# Patient Record
Sex: MALE | Race: WHITE | NOT HISPANIC OR LATINO | Employment: FULL TIME | URBAN - METROPOLITAN AREA
[De-identification: names, ages, dates, MRNs, and addresses within clinical notes are randomized per-mention and may not be internally consistent; named-entity substitution may affect disease eponyms.]

---

## 2019-02-25 ENCOUNTER — OFFICE VISIT (OUTPATIENT)
Dept: URGENT CARE | Facility: CLINIC | Age: 39
End: 2019-02-25
Payer: COMMERCIAL

## 2019-02-25 VITALS
RESPIRATION RATE: 16 BRPM | SYSTOLIC BLOOD PRESSURE: 150 MMHG | HEIGHT: 68 IN | DIASTOLIC BLOOD PRESSURE: 90 MMHG | WEIGHT: 200 LBS | OXYGEN SATURATION: 96 % | BODY MASS INDEX: 30.31 KG/M2 | HEART RATE: 69 BPM | TEMPERATURE: 97.9 F

## 2019-02-25 DIAGNOSIS — J03.90 TONSILLITIS WITH EXUDATE: ICD-10-CM

## 2019-02-25 DIAGNOSIS — H69.92 EUSTACHIAN TUBE DISORDER, LEFT: ICD-10-CM

## 2019-02-25 PROCEDURE — 99204 OFFICE O/P NEW MOD 45 MIN: CPT | Performed by: PHYSICIAN ASSISTANT

## 2019-02-25 RX ORDER — AMOXICILLIN 875 MG/1
875 TABLET, COATED ORAL 2 TIMES DAILY
Qty: 20 TAB | Refills: 0 | Status: SHIPPED | OUTPATIENT
Start: 2019-02-25 | End: 2023-09-22

## 2019-02-25 RX ORDER — FLUTICASONE PROPIONATE 50 MCG
1 SPRAY, SUSPENSION (ML) NASAL DAILY
Qty: 16 G | Refills: 0 | Status: SHIPPED | OUTPATIENT
Start: 2019-02-25

## 2019-02-25 ASSESSMENT — ENCOUNTER SYMPTOMS
TROUBLE SWALLOWING: 1
SHORTNESS OF BREATH: 0
HEADACHES: 1
SORE THROAT: 1
COUGH: 0
SWOLLEN GLANDS: 1
SINUS PAIN: 0
DIARRHEA: 0
VOMITING: 0
CHILLS: 0
MUSCULOSKELETAL NEGATIVE: 1
FEVER: 0
CARDIOVASCULAR NEGATIVE: 1
RESPIRATORY NEGATIVE: 1
GASTROINTESTINAL NEGATIVE: 1

## 2019-02-26 NOTE — PROGRESS NOTES
Subjective:      Josr Sarmiento is a 38 y.o. male who presents with Otalgia (Lt ear pain, it got pain and its ringing, pain radiates to his neck, it started out last week with sore throat then it got worse, it hurts to swallow)            Pharyngitis    This is a new problem. The current episode started in the past 7 days. The problem has been gradually worsening. Associated symptoms include ear pain, headaches, swollen glands and trouble swallowing. Pertinent negatives include no congestion, coughing, diarrhea, ear discharge, shortness of breath or vomiting. He has had no exposure to strep. He has tried gargles and acetaminophen for the symptoms. The treatment provided mild relief.       PMH:  has no past medical history on file.  MEDS:   Current Outpatient Prescriptions:   •  oxycodone-acetaminophen (PERCOCET) 5-325 MG TABS, Take 1-2 Tabs by mouth every four hours as needed for Mild Pain (pain). (Patient not taking: Reported on 2/25/2019), Disp: 20 Each, Rfl: 0  ALLERGIES: No Known Allergies  SURGHX:   Past Surgical History:   Procedure Laterality Date   • FLEXOR TENDON REPAIR  7/1/2011    Performed by CARISSA TAYLOR at [facility] ORS   • NERVE REPAIR  7/1/2011    Performed by CARISSA TAYLOR at [facility] OR[facility]   • TENOTOMY  7/1/2011    Performed by CAR[facility]SA TAYLOR at [facility]     SOCHX:  reports that he has quit smoking. He has never used smokeless tobacco. He reports that he drinks alcohol. He reports that he does not use drugs.  FH: family history is not on file.      Review of Systems   Constitutional: Negative for chills and fever.   HENT: Positive for ear pain, sore throat and trouble swallowing. Negative for congestion, ear discharge, hearing loss, sinus pain and tinnitus.    Respiratory: Negative.  Negative for cough and shortness of breath.    Cardiovascular: Negative.    Gastrointestinal: Negative.  Negative for diarrhea and vomiting.   Musculoskeletal: Negative.  "   Neurological: Positive for headaches.   All other systems reviewed and are negative.      Medications, Allergies, and current problem list reviewed today in Epic  Family history reviewed with patient and is not pertinent for today's visit     Objective:     /90   Pulse 69   Temp 36.6 °C (97.9 °F)   Resp 16   Ht 1.727 m (5' 8\")   Wt 90.7 kg (200 lb)   SpO2 96%   BMI 30.41 kg/m²      Physical Exam   Constitutional: He is oriented to person, place, and time. He appears well-developed and well-nourished. No distress.   HENT:   Head: Normocephalic and atraumatic.   Right Ear: Hearing, tympanic membrane, external ear and ear canal normal. Tympanic membrane is not erythematous.   Left Ear: Hearing, tympanic membrane, external ear and ear canal normal. Tympanic membrane is not erythematous.   Nose: Nose normal.   Mouth/Throat: Normal dentition. No dental caries. Oropharyngeal exudate, posterior oropharyngeal edema and posterior oropharyngeal erythema present.   Eyes: Conjunctivae are normal. Right eye exhibits no discharge. Left eye exhibits no discharge.   Neck: Normal range of motion. Neck supple.   Cardiovascular: Normal rate, regular rhythm and normal heart sounds.    No murmur heard.  Pulmonary/Chest: Effort normal and breath sounds normal. No respiratory distress. He has no wheezes. He has no rales.   Lymphadenopathy:        Head (right side): Submandibular adenopathy present.        Head (left side): Submandibular adenopathy present.     He has cervical adenopathy.        Right cervical: No superficial cervical adenopathy present.       Left cervical: No superficial cervical adenopathy present.   Neurological: He is alert and oriented to person, place, and time.   Skin: Skin is warm, dry and intact. No rash noted. He is not diaphoretic. No cyanosis or erythema. Nails show no clubbing.   Psychiatric: He has a normal mood and affect. His behavior is normal. Judgment and thought content normal.   Nursing " note and vitals reviewed.              Assessment/Plan:     1. Tonsillitis with exudate  amoxicillin (AMOXIL) 875 MG tablet   2. Eustachian tube disorder, left  fluticasone (FLONASE) 50 MCG/ACT nasal spray     Take full course of antibiotic  Increase fluids and rest  Advil or Tylenol for fever and pain  Warm beverages or Chloraseptic spray    Return to clinic or go to ED if symptoms worsen or persist. Indications for ED discussed at length. Patient voices understanding. Follow-up with your primary care provider in 3-5 days. Red flags discussed. All side effects of medication discussed including allergic response, GI upset, tendon injury, etc.    Please note that this dictation was created using voice recognition software. I have made every reasonable attempt to correct obvious errors, but I expect that there are errors of grammar and possibly content that I did not discover before finalizing the note.

## 2020-10-08 ENCOUNTER — OFFICE VISIT (OUTPATIENT)
Dept: URGENT CARE | Facility: CLINIC | Age: 40
End: 2020-10-08
Payer: COMMERCIAL

## 2020-10-08 ENCOUNTER — APPOINTMENT (OUTPATIENT)
Dept: RADIOLOGY | Facility: IMAGING CENTER | Age: 40
End: 2020-10-08
Attending: PHYSICIAN ASSISTANT
Payer: COMMERCIAL

## 2020-10-08 VITALS
SYSTOLIC BLOOD PRESSURE: 122 MMHG | WEIGHT: 193.2 LBS | RESPIRATION RATE: 14 BRPM | TEMPERATURE: 97.4 F | OXYGEN SATURATION: 94 % | BODY MASS INDEX: 29.28 KG/M2 | HEART RATE: 64 BPM | HEIGHT: 68 IN | DIASTOLIC BLOOD PRESSURE: 72 MMHG

## 2020-10-08 DIAGNOSIS — S43.401A SPRAIN OF RIGHT SHOULDER, UNSPECIFIED SHOULDER SPRAIN TYPE, INITIAL ENCOUNTER: ICD-10-CM

## 2020-10-08 DIAGNOSIS — M25.511 ACUTE PAIN OF RIGHT SHOULDER: ICD-10-CM

## 2020-10-08 PROCEDURE — 99214 OFFICE O/P EST MOD 30 MIN: CPT | Performed by: PHYSICIAN ASSISTANT

## 2020-10-08 PROCEDURE — 73030 X-RAY EXAM OF SHOULDER: CPT | Mod: TC,RT | Performed by: PHYSICIAN ASSISTANT

## 2020-10-08 ASSESSMENT — ENCOUNTER SYMPTOMS
FEVER: 0
MYALGIAS: 1
TINGLING: 0
CHILLS: 0
SHORTNESS OF BREATH: 0
SENSORY CHANGE: 0
FOCAL WEAKNESS: 0
COUGH: 0

## 2020-10-08 NOTE — PROGRESS NOTES
Subjective:   Josr Sarmiento is a 40 y.o. male who presents for Shoulder Pain (x 2 days on R shoulder.  He is unable to extend it without having pain.  He said last month he was lifting weight and he strained the shoulder. )      HPI  Patient is a 40-year-old male presents with right shoulder pain.  He states 1 month ago he was performing dips exercise and felt pain to his right shoulder.  He states he rested his shoulder and went on vacation and his pain significantly reduced.  He states once in a while he will feel a twinge to his right shoulder but no significant pain.  His pain returned to his right shoulder after performing dips exercises again about 5 to 7 days ago.  He denies any popping sounds.  However, he did feel a possible tear or injury.  His pain is located to his lateral posterior shoulder joint.  His pain is very mild at rest exacerbated with laying on his right shoulder, lifting, and other motions.  He reports associated grinding feeling with shoulder rotation.  He states ibuprofen provides some relief.  He denies any numbness, tingling, weakness, chest pain, shortness of breath, cough.  No prior history of right shoulder injury.        Review of Systems   Constitutional: Negative for chills and fever.   Respiratory: Negative for cough and shortness of breath.    Cardiovascular: Negative for chest pain.   Musculoskeletal: Positive for joint pain (right shoulder) and myalgias.   Neurological: Negative for tingling, sensory change and focal weakness.       Medications:    • amoxicillin  • fluticasone  • oxyCODONE-acetaminophen Tabs    Allergies: Patient has no known allergies.    Problem List: Josr Sarmiento does not have a problem list on file.    Surgical History:  Past Surgical History:   Procedure Laterality Date   • FLEXOR TENDON REPAIR  7/1/2011    Performed by CARISSA TAYLOR at [facility]   • NERVE REPAIR  7/1/2011    Performed by CARISSA T[facility] at [facility]  "[facility]   • TENOTOMY  7/1/2011    Performed by CARISSA TAYLOR at [facility]       Past Social Hx: Josr Sarmiento  reports that he has quit smoking. He has never used smokeless tobacco. He reports current alcohol use. He reports that he does not use drugs.     Past Family Hx:  Josr Sarmiento family history is not on file.     Problem list, medications, and allergies reviewed by myself today in Epic.     Objective:     /72   Pulse 64   Temp 36.3 °C (97.4 °F) (Temporal)   Resp 14   Ht 1.727 m (5' 8\")   Wt 87.6 kg (193 lb 3.2 oz)   SpO2 94%   BMI 29.38 kg/m²     Physical Exam  Vitals signs reviewed.   Constitutional:       General: He is not in acute distress.     Appearance: Normal appearance. He is not ill-appearing or toxic-appearing.   Eyes:      Conjunctiva/sclera: Conjunctivae normal.      Pupils: Pupils are equal, round, and reactive to light.   Neck:      Musculoskeletal: Neck supple.   Cardiovascular:      Rate and Rhythm: Normal rate and regular rhythm.      Heart sounds: Normal heart sounds.   Pulmonary:      Effort: Pulmonary effort is normal. No respiratory distress.      Breath sounds: Normal breath sounds. No wheezing, rhonchi or rales.   Musculoskeletal:      Comments: Right shoulder: Full range of motion.   Normal appearance.   Strength 5/5 to flexion, extension, abduction, adduction, internal and external rotation.   Normal and equal  strength  Mild reproduction of tenderness with external and internal rotation.   Negative empty cans.  Questionable positive crank test.  Patient reports pain with this test.   Mild crepitus around joint with rotation.   No significant AC separation on visual exam   Negative erythema, edema, ecchymosis, deformity.       Lymphadenopathy:      Cervical: No cervical adenopathy.   Skin:     General: Skin is warm and dry.   Neurological:      General: No focal deficit present.      Mental Status: He is alert and oriented to " person, place, and time.   Psychiatric:         Mood and Affect: Mood normal.         Behavior: Behavior normal.       DX: Right Shoulder:   COMPARISON: None     FINDINGS:  No acute fracture, dislocation, or soft tissue abnormality.     IMPRESSION:     No acute fracture or arthropathy.    Assessment/Plan:     Diagnosis and associated orders:     1. Sprain of right shoulder, unspecified shoulder sprain type, initial encounter  DX-SHOULDER 2+ RIGHT      Comments/MDM:     • Results per radiologist interpretation above. I agree with radiologist.   • Discussed with patient signs symptoms most likely a delayed healing shoulder sprain versus strain.  Discussed possibility of labral tear.   • Due to chronicity and recurrence, will refer to sports medicine for further evaluation and treatment.   • Symptomatic and supportive care of rest.  No heavy lifting, dips, or any other activity that may reproduce the pain.  Ibuprofen 600 to 800 mg every 8 hours as needed for moderate severe pain, ice application or heat whichever feels better, range of motion exercises as tolerated.          Supportive care, differential diagnoses, and indications for immediate follow-up discussed with patient.    Pathogenesis of diagnosis discussed including typical length and natural progression. Patient expresses understanding and agrees to plan.    Advised the patient to follow-up with the primary care physician for recheck, reevaluation, and consideration of further management.    Please note that this dictation was created using voice recognition software. I have made a reasonable attempt to correct obvious errors, but I expect that there are errors of grammar and possibly content that I did not discover before finalizing the note.    This note was electronically signed by Darrel Dubon PA-C

## 2021-07-04 ENCOUNTER — HOSPITAL ENCOUNTER (EMERGENCY)
Facility: MEDICAL CENTER | Age: 41
End: 2021-07-04
Attending: EMERGENCY MEDICINE
Payer: COMMERCIAL

## 2021-07-04 VITALS
HEART RATE: 75 BPM | WEIGHT: 201.28 LBS | SYSTOLIC BLOOD PRESSURE: 145 MMHG | BODY MASS INDEX: 30.51 KG/M2 | RESPIRATION RATE: 16 BRPM | DIASTOLIC BLOOD PRESSURE: 88 MMHG | OXYGEN SATURATION: 98 % | HEIGHT: 68 IN | TEMPERATURE: 97 F

## 2021-07-04 DIAGNOSIS — S60.512A HAND ABRASION, LEFT, INITIAL ENCOUNTER: ICD-10-CM
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
## 2021-07-04 DIAGNOSIS — S09.90XA CLOSED HEAD INJURY, INITIAL ENCOUNTER: ICD-10-CM

## 2021-07-04 PROCEDURE — 700102 HCHG RX REV CODE 250 W/ 637 OVERRIDE(OP): Performed by: EMERGENCY MEDICINE

## 2021-07-04 PROCEDURE — A9270 NON-COVERED ITEM OR SERVICE: HCPCS | Performed by: EMERGENCY MEDICINE

## 2021-07-04 PROCEDURE — 99284 EMERGENCY DEPT VISIT MOD MDM: CPT

## 2021-07-04 RX ORDER — IBUPROFEN 600 MG/1
600 TABLET ORAL ONCE
Status: COMPLETED | OUTPATIENT
Start: 2021-07-04 | End: 2021-07-04

## 2021-07-04 RX ADMIN — IBUPROFEN 600 MG: 600 TABLET, FILM COATED ORAL at 09:49
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
## 2022-08-09 ENCOUNTER — HOSPITAL ENCOUNTER (EMERGENCY)
Facility: MEDICAL CENTER | Age: 42
End: 2022-08-09
Attending: EMERGENCY MEDICINE
Payer: COMMERCIAL

## 2022-08-09 VITALS
SYSTOLIC BLOOD PRESSURE: 137 MMHG | WEIGHT: 191.58 LBS | BODY MASS INDEX: 29.13 KG/M2 | HEART RATE: 91 BPM | RESPIRATION RATE: 16 BRPM | OXYGEN SATURATION: 96 % | DIASTOLIC BLOOD PRESSURE: 92 MMHG | TEMPERATURE: 97.4 F

## 2022-08-09 DIAGNOSIS — M25.511 STERNOCLAVICULAR JOINT PAIN, RIGHT: ICD-10-CM

## 2022-08-09 PROCEDURE — 99283 EMERGENCY DEPT VISIT LOW MDM: CPT

## 2022-08-09 RX ORDER — OXYCODONE HYDROCHLORIDE 5 MG/1
5 TABLET ORAL EVERY 6 HOURS PRN
Qty: 10 TABLET | Refills: 0 | Status: SHIPPED | OUTPATIENT
Start: 2022-08-09 | End: 2022-08-12

## 2022-08-10 NOTE — ED TRIAGE NOTES
Pt comes in complaining of clavicle pain for approx 1 month. Pt stating if I move a certain way it feels worse. Pt did have xrays taken about 1 month ago with his PCP and was given no diagnosis

## 2022-08-10 NOTE — ED PROVIDER NOTES
ED Provider Note    CHIEF COMPLAINT   Chief Complaint   Patient presents with   • Pain       HPI   Josr Sarmiento is a 41 y.o. male who presents to the ED with right shoulder pain.  The patient states approximately 1 to 2 months ago the patient injured it.  The patient works 12-hour days, the pain has not gone away, pops frequently, the pain is around the sternoclavicular joint.  He has some soft tissue swelling, the pain gets worse he feels like he is having trouble swallowing.  Patient does not have any trouble breathing.  Patient denies any chest pains, shortness of breath, abdominal pains.  He has already gotten x-rays from his primary care physician.    REVIEW OF SYSTEMS   See HPI for further details.    PAST MEDICAL HISTORY   History reviewed. No pertinent past medical history.    FAMILY HISTORY  No family history on file.    SOCIAL HISTORY  Social History     Socioeconomic History   • Marital status: Legally    Tobacco Use   • Smoking status: Former Smoker   • Smokeless tobacco: Never Used   Vaping Use   • Vaping Use: Never used   Substance and Sexual Activity   • Alcohol use: Yes     Comment: 3 per week   • Drug use: No       SURGICAL HISTORY  Past Surgical History:   Procedure Laterality Date   • REPAIR, TENDON, LOWER EXTREMITY, USING TENDON GRAFT  7/1/2011    Performed by CARISSA TAYLOR at [facility]   • NERVE REPAIR  7/1/2011    Performed by CARISSA TAYLOR at [facility]   • TENOTOMY  7/1/2011    Performed by CARISSA TAYLOR at [facility]       CURRENT MEDICATIONS   Home Medications    **Home medications have not yet been reviewed for this encounter**         ALLERGIES   No Known Allergies    PHYSICAL EXAM  VITAL SIGNS: BP (!) 140/88   Pulse (!) 58   Temp 36.4 °C (97.6 °F) (Temporal)   Resp 15   Wt 86.9 kg (191 lb 9.3 oz)   SpO2 97%   BMI 29.13 kg/m²   Constitutional: Well developed, Well nourished, mild distress, Non-toxic appearance.    HENT:  Atraumatic, Normocephalic, Oral pharynx with moist mucous membranes.   Eyes: EOMI, PERRL  Cardiovascular: Good Pulses  Thorax & Lungs: No respiratory distress  Skin: Warm, Dry, No erythema, No rash.   Musculoskeletal: Slight soft tissue swelling tenderness palpation on the right sterno clavicular joint area with tenderness palpation, otherwise the patient has full range of motion of the right shoulder.  Patient lung sounds are clear.  The patient has no stridor.  Neurologic: Alert & oriented x 3,     COURSE & MEDICAL DECISION MAKING  Pertinent Labs & Imaging studies reviewed. (See chart for details)  Patient has joint strain of the right sternoclavicular joint.  We will put the patient in a sling, give the patient some pain medicines, have the patient take some Tylenol ibuprofen, will give the patient a prescription for oxycodone, have the patient follow-up with orthopedics, have the patient return with worsening symptoms.    I reviewed prescription monitoring program for patient's narcotic use before prescribing a scheduled drug.The patient will not drink alcohol nor drive with prescribed medications.} The patient will return for new or worsening symptoms and is stable at the time of discharge.    The patient is referred to a primary physician for blood pressure management, diabetic screening, and for all other preventative health concerns.    In prescribing controlled substances to this patient, I certify that I have obtained and reviewed the medical history of Josr Sarmiento. I have also made a good bayron effort to obtain applicable records from other providers who have treated the patient and records did not demonstrate any increased risk of substance abuse that would prevent me from prescribing controlled substances.     I have conducted a physical exam and documented it. I have reviewed Mr. Sarmiento’s prescription history as maintained by the [place] Prescription Monitoring Program.     I have  assessed the patient’s risk for abuse, dependency, and addiction using the validated Opioid Risk Tool available at https://www.mdcalc.com/vawvhr-ntba-nkxh-ort-narcotic-abuse.     Given the above, I believe the benefits of controlled substance therapy outweigh the risks. The reasons for prescribing controlled substances include non-narcotic, oral analgesic alternatives have been inadequate for pain control. Accordingly, I have discussed the risk and benefits, treatment plan, and alternative therapies with the patient.         DISPOSITION:  Patient will be discharged home in stable condition.    FOLLOW UP:  [facility], Emergency Dept  [address] [id number] [phone]    If symptoms worsen    Alec Fountain M.D.  [address] [facility] [address]  [phone]            OUTPATIENT MEDICATIONS:  Discharge Medication List as of 8/9/2022  5:27 PM      START taking these medications    Details   oxyCODONE immediate-release (ROXICODONE) 5 MG Tab Take 1 Tablet by mouth every 6 hours as needed for Severe Pain for up to 3 days., Disp-10 Tablet, R-0, Normal               FINAL IMPRESSION  1. Sternoclavicular joint pain, right        Patient referred to primary care provider for blood pressure management     This dictation was created using voice recognition software. The accuracy of the dictation is limited to the abilities of the software. I expect there may be some errors of grammar and possibly content. The nursing notes were reviewed and certain aspects of this information were incorporated into this note.    Electronically signed by: Carlo Rios M.D., 8/9/2022 5:31 PM

## 2022-08-10 NOTE — ED NOTES
Discharge instructions given.  All questions answered.  Prescriptions given x1.  Pt to follow-up with Ortho, return to ER if symptoms worsen as discussed.  Pt verbalized understanding.  All belongings with pt.  Pt ambulated to lobby.

## 2023-05-19 ENCOUNTER — APPOINTMENT (OUTPATIENT)
Dept: RADIOLOGY | Facility: MEDICAL CENTER | Age: 43
End: 2023-05-19
Attending: EMERGENCY MEDICINE
Payer: COMMERCIAL

## 2023-05-19 ENCOUNTER — HOSPITAL ENCOUNTER (EMERGENCY)
Facility: MEDICAL CENTER | Age: 43
End: 2023-05-19
Attending: EMERGENCY MEDICINE
Payer: COMMERCIAL

## 2023-05-19 VITALS
HEART RATE: 67 BPM | SYSTOLIC BLOOD PRESSURE: 127 MMHG | TEMPERATURE: 98.2 F | OXYGEN SATURATION: 93 % | HEIGHT: 68 IN | RESPIRATION RATE: 18 BRPM | BODY MASS INDEX: 28.33 KG/M2 | WEIGHT: 186.95 LBS | DIASTOLIC BLOOD PRESSURE: 74 MMHG

## 2023-05-19 DIAGNOSIS — R11.2 NAUSEA AND VOMITING, UNSPECIFIED VOMITING TYPE: ICD-10-CM

## 2023-05-19 DIAGNOSIS — E87.6 HYPOKALEMIA: ICD-10-CM

## 2023-05-19 DIAGNOSIS — R56.9 SEIZURE-LIKE ACTIVITY (HCC): ICD-10-CM

## 2023-05-19 LAB
ALBUMIN SERPL BCP-MCNC: 4.8 G/DL (ref 3.2–4.9)
ALBUMIN/GLOB SERPL: 1.7 G/DL
ALP SERPL-CCNC: 69 U/L (ref 30–99)
ALT SERPL-CCNC: 11 U/L (ref 2–50)
ANION GAP SERPL CALC-SCNC: 14 MMOL/L (ref 7–16)
AST SERPL-CCNC: 24 U/L (ref 12–45)
BASOPHILS # BLD AUTO: 0.2 % (ref 0–1.8)
BASOPHILS # BLD: 0.03 K/UL (ref 0–0.12)
BILIRUB SERPL-MCNC: 0.6 MG/DL (ref 0.1–1.5)
BUN SERPL-MCNC: 13 MG/DL (ref 8–22)
CALCIUM ALBUM COR SERPL-MCNC: 8.8 MG/DL (ref 8.5–10.5)
CALCIUM SERPL-MCNC: 9.4 MG/DL (ref 8.5–10.5)
CHLORIDE SERPL-SCNC: 104 MMOL/L (ref 96–112)
CO2 SERPL-SCNC: 20 MMOL/L (ref 20–33)
CREAT SERPL-MCNC: 0.82 MG/DL (ref 0.5–1.4)
EKG IMPRESSION: NORMAL
EOSINOPHIL # BLD AUTO: 0.02 K/UL (ref 0–0.51)
EOSINOPHIL NFR BLD: 0.1 % (ref 0–6.9)
ERYTHROCYTE [DISTWIDTH] IN BLOOD BY AUTOMATED COUNT: 45.5 FL (ref 35.9–50)
GFR SERPLBLD CREATININE-BSD FMLA CKD-EPI: 113 ML/MIN/1.73 M 2
GLOBULIN SER CALC-MCNC: 2.8 G/DL (ref 1.9–3.5)
GLUCOSE SERPL-MCNC: 111 MG/DL (ref 65–99)
HCT VFR BLD AUTO: 47.1 % (ref 42–52)
HGB BLD-MCNC: 15.9 G/DL (ref 14–18)
IMM GRANULOCYTES # BLD AUTO: 0.06 K/UL (ref 0–0.11)
IMM GRANULOCYTES NFR BLD AUTO: 0.4 % (ref 0–0.9)
LYMPHOCYTES # BLD AUTO: 2.98 K/UL (ref 1–4.8)
LYMPHOCYTES NFR BLD: 21.8 % (ref 22–41)
MCH RBC QN AUTO: 30.5 PG (ref 27–33)
MCHC RBC AUTO-ENTMCNC: 33.8 G/DL (ref 33.7–35.3)
MCV RBC AUTO: 90.4 FL (ref 81.4–97.8)
MONOCYTES # BLD AUTO: 1.25 K/UL (ref 0–0.85)
MONOCYTES NFR BLD AUTO: 9.1 % (ref 0–13.4)
NEUTROPHILS # BLD AUTO: 9.36 K/UL (ref 1.82–7.42)
NEUTROPHILS NFR BLD: 68.4 % (ref 44–72)
NRBC # BLD AUTO: 0 K/UL
NRBC BLD-RTO: 0 /100 WBC
PLATELET # BLD AUTO: 277 K/UL (ref 164–446)
PMV BLD AUTO: 9.1 FL (ref 9–12.9)
POTASSIUM SERPL-SCNC: 3.3 MMOL/L (ref 3.6–5.5)
PROT SERPL-MCNC: 7.6 G/DL (ref 6–8.2)
RBC # BLD AUTO: 5.21 M/UL (ref 4.7–6.1)
SODIUM SERPL-SCNC: 138 MMOL/L (ref 135–145)
WBC # BLD AUTO: 13.7 K/UL (ref 4.8–10.8)

## 2023-05-19 PROCEDURE — 70450 CT HEAD/BRAIN W/O DYE: CPT

## 2023-05-19 PROCEDURE — 36415 COLL VENOUS BLD VENIPUNCTURE: CPT

## 2023-05-19 PROCEDURE — 93005 ELECTROCARDIOGRAM TRACING: CPT | Performed by: EMERGENCY MEDICINE

## 2023-05-19 PROCEDURE — 85025 COMPLETE CBC W/AUTO DIFF WBC: CPT

## 2023-05-19 PROCEDURE — 71045 X-RAY EXAM CHEST 1 VIEW: CPT

## 2023-05-19 PROCEDURE — 80053 COMPREHEN METABOLIC PANEL: CPT

## 2023-05-19 PROCEDURE — 99284 EMERGENCY DEPT VISIT MOD MDM: CPT

## 2023-05-19 RX ORDER — ONDANSETRON 4 MG/1
4 TABLET, ORALLY DISINTEGRATING ORAL EVERY 6 HOURS PRN
Qty: 10 TABLET | Refills: 0 | Status: SHIPPED | OUTPATIENT
Start: 2023-05-19

## 2023-05-19 NOTE — ED NOTES
Patient back to room 36 green. Patient states seizure happened in his sleep 2 days ago. Patient states he was also vomiting during the seizure and after. Patient A/O x 4.

## 2023-05-19 NOTE — ED PROVIDER NOTES
ED Provider Note    CHIEF COMPLAINT  Chief Complaint   Patient presents with    Seizure     Pt had witnessed seizure yesterday lasting 2 mins. +tongue trauma. Pt was sleeping when seizure began. -Head trauma.        EXTERNAL RECORDS REVIEWED  Outpatient Notes patient's last outpatient visits on our system were to orthopedic surgery for pain in the right wrist and shoulder in August 2022    HPI/ROS  LIMITATION TO HISTORY   Select: : None  OUTSIDE HISTORIAN(S):  none    Josr Sarmiento is a 42 y.o. male who presents complaining of having 3 seizures since January of this year.  His significant other noticed them when he was sleeping.  The patient states that she described it as a shaking all over the last about 2 minutes.  He would then wake up and have a dull headache.  His first seizure was in January and his second 1 was in March and his last one was a day and a half ago.  He states that he had vomiting during the seizure and afterwards.  The patient has no seizure history.  He has no history of head trauma.  He does not drink alcohol except for very rarely.  He denies any smoking or drug use.  He does drink a lot of caffeine and energy drinks.  He has not noticed numbness or weakness in his extremities.  He has not had any fevers chills or neck stiffness.  He denies any shortness of breath or coughing.    PAST MEDICAL HISTORY       SURGICAL HISTORY   has a past surgical history that includes repair, tendon, lower extremity, using tendon graft (7/1/2011); nerve repair (7/1/2011); and tenotomy (7/1/2011).    FAMILY HISTORY  History reviewed. No pertinent family history.    SOCIAL HISTORY  Social History     Tobacco Use    Smoking status: Former    Smokeless tobacco: Never   Vaping Use    Vaping Use: Never used   Substance and Sexual Activity    Alcohol use: Yes     Comment: twice a month    Drug use: No    Sexual activity: Not on file       CURRENT MEDICATIONS  Home Medications       Reviewed by Cece KLEIN  "AKASH Monroe (Registered Nurse) on 05/19/23 at 1127  Med List Status: Not Addressed     Medication Last Dose Status   amoxicillin (AMOXIL) 875 MG tablet  Active   fluticasone (FLONASE) 50 MCG/ACT nasal spray  Active                    ALLERGIES  No Known Allergies    PHYSICAL EXAM  VITAL SIGNS: BP (!) 142/73   Pulse 64   Temp 36.7 °C (98.1 °F) (Temporal)   Resp 16   Ht 1.727 m (5' 8\")   Wt 84.8 kg (186 lb 15.2 oz)   SpO2 96%   BMI 28.43 kg/m²      Constitutional: Well developed, Well nourished, No acute distress, Non-toxic appearance.   HEENT: Normocephalic, Atraumatic,  external ears normal, pharynx pink,  Mucous  Membranes moist, No rhinorrhea or mucosal edema  Eyes: PERRL, EOMI, Conjunctiva normal, No discharge.   Neck: Normal range of motion, No tenderness, Supple, No stridor.   Lymphatic: No lymphadenopathy    Cardiovascular: Regular Rate and Rhythm, No murmurs,  rubs, or gallops.   Thorax & Lungs: Lungs clear to auscultation bilaterally, No respiratory distress, No wheezes, rhales or rhonchi, No chest wall tenderness.   Abdomen: Bowel sounds normal, Soft, non tender, non distended,  No pulsatile masses., no rebound guarding or peritoneal signs.   Skin: Warm, Dry, No erythema, No rash,   Back:  No CVA tenderness,  No spinal tenderness, bony crepitance step offs or instability.   Extremities: Equal, intact distal pulses, No cyanosis, clubbing or edema,  No tenderness.   Musculoskeletal: Good range of motion in all major joints. No tenderness to palpation or major deformities noted.   Neurologic: Alert & oriented x 3, Cranial nerves II-XII intact, Equal strength and sensation upper and lower extremities bilaterally,  No focal deficits noted.  NIH is 0  Psychiatric: Affect normal, Judgment normal, Mood normal.       DIAGNOSTIC STUDIES / PROCEDURES  EKG  I have independently interpreted this EKG  See below    LABS  Results for orders placed or performed during the hospital encounter of 05/19/23   CBC WITH " DIFFERENTIAL   Result Value Ref Range    WBC 13.7 (H) 4.8 - 10.8 K/uL    RBC 5.21 4.70 - 6.10 M/uL    Hemoglobin 15.9 14.0 - 18.0 g/dL    Hematocrit 47.1 42.0 - 52.0 %    MCV 90.4 81.4 - 97.8 fL    MCH 30.5 27.0 - 33.0 pg    MCHC 33.8 33.7 - 35.3 g/dL    RDW 45.5 35.9 - 50.0 fL    Platelet Count 277 164 - 446 K/uL    MPV 9.1 9.0 - 12.9 fL    Neutrophils-Polys 68.40 44.00 - 72.00 %    Lymphocytes 21.80 (L) 22.00 - 41.00 %    Monocytes 9.10 0.00 - 13.40 %    Eosinophils 0.10 0.00 - 6.90 %    Basophils 0.20 0.00 - 1.80 %    Immature Granulocytes 0.40 0.00 - 0.90 %    Nucleated RBC 0.00 /100 WBC    Neutrophils (Absolute) 9.36 (H) 1.82 - 7.42 K/uL    Lymphs (Absolute) 2.98 1.00 - 4.80 K/uL    Monos (Absolute) 1.25 (H) 0.00 - 0.85 K/uL    Eos (Absolute) 0.02 0.00 - 0.51 K/uL    Baso (Absolute) 0.03 0.00 - 0.12 K/uL    Immature Granulocytes (abs) 0.06 0.00 - 0.11 K/uL    NRBC (Absolute) 0.00 K/uL   Comp Metabolic Panel   Result Value Ref Range    Sodium 138 135 - 145 mmol/L    Potassium 3.3 (L) 3.6 - 5.5 mmol/L    Chloride 104 96 - 112 mmol/L    Co2 20 20 - 33 mmol/L    Anion Gap 14.0 7.0 - 16.0    Glucose 111 (H) 65 - 99 mg/dL    Bun 13 8 - 22 mg/dL    Creatinine 0.82 0.50 - 1.40 mg/dL    Calcium 9.4 8.5 - 10.5 mg/dL    AST(SGOT) 24 12 - 45 U/L    ALT(SGPT) 11 2 - 50 U/L    Alkaline Phosphatase 69 30 - 99 U/L    Total Bilirubin 0.6 0.1 - 1.5 mg/dL    Albumin 4.8 3.2 - 4.9 g/dL    Total Protein 7.6 6.0 - 8.2 g/dL    Globulin 2.8 1.9 - 3.5 g/dL    A-G Ratio 1.7 g/dL   ESTIMATED GFR   Result Value Ref Range    GFR (CKD-EPI) 113 >60 mL/min/1.73 m 2   CORRECTED CALCIUM   Result Value Ref Range    Correct Calcium 8.8 8.5 - 10.5 mg/dL   EKG   Result Value Ref Range    Report       [facility] Emergency Dept.    Test Date:  2023-05-19  Pt Name:    FAVIOLA LAGUNA               Department: ER  MRN:        [id number]                      Room:       [facility]  Gender:     Male                         Technician:  [id number]  :        1980                   Requested By:ALEJANDRA WALDRON  Order #:    [id number]                    Reading MD: ALEJANDRA WALDRON MD    Measurements  Intervals                                Axis  Rate:       51                           P:          65  WI:         158                          QRS:        58  QRSD:       99                           T:          7  QT:         436  QTc:        402    Interpretive Statements  Sinus bradycardia  Abnormal R-wave progression, early transition  Borderline T wave abnormalities  No previous ECG available for comparison  Electronically Signed On 2023 15:07:04 PDT by ALEJANDRA WALDRON MD          RADIOLOGY  I have independently interpreted the diagnostic imaging associated with this visit and am waiting the final reading from the radiologist.   My preliminary interpretation is as follows: Chest x-ray in infiltrates, cardiomegaly or pleural effusions  Radiologist interpretation:   CT-HEAD W/O   Final Result      No acute intracranial abnormality.         DX-CHEST-PORTABLE (1 VIEW)   Final Result      1.  No acute cardiac or pulmonary abnormalities are identified.            COURSE & MEDICAL DECISION MAKING    ED Observation Status? Yes; I am placing the patient in to an observation status due to a diagnostic uncertainty as well as therapeutic intensity. Patient placed in observation status at 12:11 PM, 2023.     Observation plan is as follows: CT head EKG chest x-ray and lab work were ordered to further evaluate the patient's symptoms    Upon Reevaluation, the patient's condition has: Improved; and will be discharged.    Patient discharged from ED Observation status at 3:08 PM  (Time) 23 (Date).     INITIAL ASSESSMENT, COURSE AND PLAN  Care Narrative: Josr Sarmiento is a 42 y.o. male who presents complaining of having 3 seizures since January of this year.  His significant other noticed them when he was sleeping.  The patient states that she  described it as a shaking all over the last about 2 minutes.  He would then wake up and have a dull headache.  His first seizure was in January and his second 1 was in March and his last one was a day and a half ago.  He states that he had vomiting during the seizure and afterwards.  The patient has no seizure history.  He has no history of head trauma.  He does not drink alcohol except for very rarely.  He denies any smoking or drug use.  He does drink a lot of caffeine and energy drinks.  He has not noticed numbness or weakness in his extremities.  He has not had any fevers chills or neck stiffness.  He denies any shortness of breath or coughing.  The patient's physical exam is normal.  He has an NIH of 0.  No meningismus.    Differential diagnosis: New onset seizure versus seizure-like activity versus lab abnormality versus intracranial pathology        ADDITIONAL PROBLEM LIST  None  DISPOSITION AND DISCUSSIONS    Patient's white blood cell count is slight elevated at 13.7 with 21% lymphocytes his hemoglobin is normal at 15.9 platelet count 277 which is normal.  Comprehensive metabolic panel is a slightly low potassium of 3.3 CO2 20 anion gap is 14 glucose is 111 which is slightly elevated.  Patient's liver function test and kidney function are normal.  Since CT head does not show any abnormalities.    We will discharge the patient home with nausea medicine.  He is to increase his potassium food intake including more bananas and potatoes.  He is return for any worsening problems such as seizures activity during the day.  He should follow-up with outpatient neurology as directed.    I have discussed management of the patient with the following physicians and RODOLFO's:  Dr Perla Neurology we discussed this patient's case.  Since his episodes are only nocturnal they could be a sleep disorder instead of a seizure.  We will not have to place him on driving restrictions at this time.  He can follow-up with neurology as an  outpatient for further work-up.  These episodes do need further evaluation but it is not completely certain that he is having seizures.  Does not recommend that any seizure medication be started at this time.    Discussion of management with other hospitals or appropriate source(s): None     Escalation of care considered, and ultimately not performed:acute inpatient care management, however at this time, the patient is most appropriate for outpatient management    Barriers to care at this time, including but not limited to:  none.     Decision tools and prescription drugs considered including, but not limited to: NIH Stroke Scale 0 .  The patient will return for new or worsening symptoms and is stable at the time of discharge.    The patient is referred to a primary physician for blood pressure management, diabetic screening, and for all other preventative health concerns.      DISPOSITION:  Patient will be discharged home in stable condition.    FOLLOW UP:  [facility]  [address] [place] [id number] [phone]  Call in 1 day  to establish care, for recheck      OUTPATIENT MEDICATIONS:  New Prescriptions    ONDANSETRON (ZOFRAN ODT) 4 MG TABLET DISPERSIBLE    Take 1 Tablet by mouth every 6 hours as needed for Nausea/Vomiting.       FINAL DIAGNOSIS  1. Seizure-like activity (HCC)    2. Nausea and vomiting, unspecified vomiting type    3. Hypokalemia           Electronically signed by: Sima Jewell M.D., 5/19/2023 11:59 AM

## 2023-05-19 NOTE — DISCHARGE INSTRUCTIONS
Return to the emergency department if you have any of these episodes during the daytime.  Otherwise get plenty of sleep and follow-up with outpatient neurology for further evaluation of these episodes

## 2023-05-19 NOTE — ED TRIAGE NOTES
"Chief Complaint   Patient presents with    Seizure     Pt had witnessed seizure yesterday lasting 2 mins. +tongue trauma. Pt was sleeping when seizure began. -Head trauma.      Pt reports this being his 3rd seizure since January. No hx seizures prior. Labs ordered. Pt educated to inform staff of any changes.     BP (!) 152/88   Pulse 73   Temp 36.7 °C (98.1 °F) (Temporal)   Resp 14   Ht 1.727 m (5' 8\")   Wt 84.8 kg (186 lb 15.2 oz)   SpO2 97%   BMI 28.43 kg/m²     "

## 2023-09-06 NOTE — ED NOTES
Pt given discharge instructions/ home care instructions explained, pt verbalized understanding of instructions given/pt understands the importance of follow up, pt ambulatory in PD custody.     Myself

## 2023-09-22 ENCOUNTER — HOSPITAL ENCOUNTER (EMERGENCY)
Facility: MEDICAL CENTER | Age: 43
End: 2023-09-22
Attending: EMERGENCY MEDICINE
Payer: COMMERCIAL

## 2023-09-22 VITALS
DIASTOLIC BLOOD PRESSURE: 85 MMHG | RESPIRATION RATE: 16 BRPM | WEIGHT: 186 LBS | HEART RATE: 66 BPM | HEIGHT: 68 IN | OXYGEN SATURATION: 95 % | BODY MASS INDEX: 28.19 KG/M2 | SYSTOLIC BLOOD PRESSURE: 167 MMHG | TEMPERATURE: 98 F

## 2023-09-22 DIAGNOSIS — R56.9 SEIZURE (HCC): ICD-10-CM

## 2023-09-22 DIAGNOSIS — S09.93XA TONGUE INJURY, INITIAL ENCOUNTER: ICD-10-CM

## 2023-09-22 DIAGNOSIS — K14.0 TONGUE INFECTION: ICD-10-CM

## 2023-09-22 LAB
ALBUMIN SERPL BCP-MCNC: 4.7 G/DL (ref 3.2–4.9)
ALBUMIN/GLOB SERPL: 1.7 G/DL
ALP SERPL-CCNC: 73 U/L (ref 30–99)
ALT SERPL-CCNC: 15 U/L (ref 2–50)
AMPHET UR QL SCN: NEGATIVE
ANION GAP SERPL CALC-SCNC: 16 MMOL/L (ref 7–16)
AST SERPL-CCNC: 27 U/L (ref 12–45)
BARBITURATES UR QL SCN: NEGATIVE
BASOPHILS # BLD AUTO: 0.4 % (ref 0–1.8)
BASOPHILS # BLD: 0.04 K/UL (ref 0–0.12)
BENZODIAZ UR QL SCN: NEGATIVE
BILIRUB SERPL-MCNC: 1.2 MG/DL (ref 0.1–1.5)
BUN SERPL-MCNC: 13 MG/DL (ref 8–22)
BZE UR QL SCN: NEGATIVE
CALCIUM ALBUM COR SERPL-MCNC: 8.9 MG/DL (ref 8.5–10.5)
CALCIUM SERPL-MCNC: 9.5 MG/DL (ref 8.5–10.5)
CANNABINOIDS UR QL SCN: POSITIVE
CHLORIDE SERPL-SCNC: 101 MMOL/L (ref 96–112)
CO2 SERPL-SCNC: 25 MMOL/L (ref 20–33)
CREAT SERPL-MCNC: 0.79 MG/DL (ref 0.5–1.4)
EOSINOPHIL # BLD AUTO: 0.12 K/UL (ref 0–0.51)
EOSINOPHIL NFR BLD: 1.1 % (ref 0–6.9)
ERYTHROCYTE [DISTWIDTH] IN BLOOD BY AUTOMATED COUNT: 42.9 FL (ref 35.9–50)
FENTANYL UR QL: NEGATIVE
GFR SERPLBLD CREATININE-BSD FMLA CKD-EPI: 114 ML/MIN/1.73 M 2
GLOBULIN SER CALC-MCNC: 2.7 G/DL (ref 1.9–3.5)
GLUCOSE SERPL-MCNC: 80 MG/DL (ref 65–99)
HCT VFR BLD AUTO: 49.7 % (ref 42–52)
HGB BLD-MCNC: 17.4 G/DL (ref 14–18)
IMM GRANULOCYTES # BLD AUTO: 0.03 K/UL (ref 0–0.11)
IMM GRANULOCYTES NFR BLD AUTO: 0.3 % (ref 0–0.9)
LYMPHOCYTES # BLD AUTO: 2.52 K/UL (ref 1–4.8)
LYMPHOCYTES NFR BLD: 23.7 % (ref 22–41)
MCH RBC QN AUTO: 31.6 PG (ref 27–33)
MCHC RBC AUTO-ENTMCNC: 35 G/DL (ref 32.3–36.5)
MCV RBC AUTO: 90.2 FL (ref 81.4–97.8)
METHADONE UR QL SCN: NEGATIVE
MONOCYTES # BLD AUTO: 0.97 K/UL (ref 0–0.85)
MONOCYTES NFR BLD AUTO: 9.1 % (ref 0–13.4)
NEUTROPHILS # BLD AUTO: 6.96 K/UL (ref 1.82–7.42)
NEUTROPHILS NFR BLD: 65.4 % (ref 44–72)
NRBC # BLD AUTO: 0 K/UL
NRBC BLD-RTO: 0 /100 WBC (ref 0–0.2)
OPIATES UR QL SCN: NEGATIVE
OXYCODONE UR QL SCN: NEGATIVE
PCP UR QL SCN: NEGATIVE
PLATELET # BLD AUTO: 271 K/UL (ref 164–446)
PMV BLD AUTO: 9.1 FL (ref 9–12.9)
POTASSIUM SERPL-SCNC: 3.3 MMOL/L (ref 3.6–5.5)
PROPOXYPH UR QL SCN: NEGATIVE
PROT SERPL-MCNC: 7.4 G/DL (ref 6–8.2)
RBC # BLD AUTO: 5.51 M/UL (ref 4.7–6.1)
SODIUM SERPL-SCNC: 142 MMOL/L (ref 135–145)
WBC # BLD AUTO: 10.6 K/UL (ref 4.8–10.8)

## 2023-09-22 PROCEDURE — 700111 HCHG RX REV CODE 636 W/ 250 OVERRIDE (IP): Mod: JZ | Performed by: EMERGENCY MEDICINE

## 2023-09-22 PROCEDURE — 99285 EMERGENCY DEPT VISIT HI MDM: CPT

## 2023-09-22 PROCEDURE — 80307 DRUG TEST PRSMV CHEM ANLYZR: CPT

## 2023-09-22 PROCEDURE — 700105 HCHG RX REV CODE 258: Performed by: EMERGENCY MEDICINE

## 2023-09-22 PROCEDURE — 36415 COLL VENOUS BLD VENIPUNCTURE: CPT

## 2023-09-22 PROCEDURE — 80053 COMPREHEN METABOLIC PANEL: CPT

## 2023-09-22 PROCEDURE — 96374 THER/PROPH/DIAG INJ IV PUSH: CPT

## 2023-09-22 PROCEDURE — 85025 COMPLETE CBC W/AUTO DIFF WBC: CPT

## 2023-09-22 RX ORDER — LEVETIRACETAM 500 MG/5ML
500 INJECTION, SOLUTION, CONCENTRATE INTRAVENOUS ONCE
Status: COMPLETED | OUTPATIENT
Start: 2023-09-22 | End: 2023-09-22

## 2023-09-22 RX ORDER — AMOXICILLIN 500 MG/1
500 CAPSULE ORAL 3 TIMES DAILY
Qty: 15 CAPSULE | Refills: 0 | Status: ACTIVE | OUTPATIENT
Start: 2023-09-22 | End: 2023-09-27

## 2023-09-22 RX ORDER — LEVETIRACETAM 750 MG/1
750 TABLET ORAL 2 TIMES DAILY
Qty: 60 TABLET | Refills: 0 | Status: SHIPPED | OUTPATIENT
Start: 2023-09-22 | End: 2023-10-22

## 2023-09-22 RX ORDER — SODIUM CHLORIDE, SODIUM LACTATE, POTASSIUM CHLORIDE, CALCIUM CHLORIDE 600; 310; 30; 20 MG/100ML; MG/100ML; MG/100ML; MG/100ML
1000 INJECTION, SOLUTION INTRAVENOUS ONCE
Status: COMPLETED | OUTPATIENT
Start: 2023-09-22 | End: 2023-09-22

## 2023-09-22 RX ADMIN — LEVETIRACETAM 500 MG: 100 INJECTION, SOLUTION, CONCENTRATE INTRAVENOUS at 14:52

## 2023-09-22 RX ADMIN — SODIUM CHLORIDE, POTASSIUM CHLORIDE, SODIUM LACTATE AND CALCIUM CHLORIDE 1000 ML: 600; 310; 30; 20 INJECTION, SOLUTION INTRAVENOUS at 14:54

## 2023-09-22 ASSESSMENT — FIBROSIS 4 INDEX: FIB4 SCORE: 1.07

## 2023-09-22 NOTE — ED TRIAGE NOTES
"Chief Complaint   Patient presents with    Seizure     Arrives with complaints of seizures   States 1 month ago he had seizures while sleeping, witnessed by girlfriend   Then 2 days ago had another seizure while asleep, + tongue trauma   Denies hx of     BP (!) 153/94   Pulse 89   Temp 36.3 °C (97.3 °F) (Temporal)   Resp 18   Ht 1.727 m (5' 8\")   Wt 84.4 kg (186 lb)   SpO2 96%   BMI 28.28 kg/m²     Pt made aware of triage process, placed back into lobby, educated pt to tell staff of any worsening of symptoms     "

## 2023-09-22 NOTE — ED PROVIDER NOTES
ED Provider Note    CHIEF COMPLAINT  Chief Complaint   Patient presents with    Seizure     Arrives with complaints of seizures   States 1 month ago he had seizures while sleeping, witnessed by girlfriend   Then 2 days ago had another seizure while asleep, + tongue trauma   Denies hx of       EXTERNAL RECORDS REVIEWED  Patient's last encounter was for seizure-like activity, nausea vomiting hypokalemia.  This was May 19 of this year.    Prior to the above visit, patient was seen in the orthopedic clinic in August 2022 for clavicle fracture and joint subluxation of the right sternoclavicular joint and acute right shoulder pain.  An additional visit August 2022 in the orthopedic clinic for right wrist pain.    Outpatient encounter October 2022 for right shoulder pain.    HPI/ROS  LIMITATION TO HISTORY   Select: : None  OUTSIDE HISTORIAN(S):  Family sister and Significant other who he lives with    Josr Sarmiento is a 42 y.o. male who presents with his girlfriend who he has been with for 4 years and has lived with for approximately the last year.  She provides most of the history.  She reports that he has been having seizures approximately every 3 months for about a year now.  He has been seen here in the emergency department.  He is undergone CT imaging and lab results and he has followed up with neurology.  However, follow-up appointments both with neurology as well as PCP have not well.  The patient's been quite resistant to the consequences of having a history of seizures including being on medication for seizure control as well as losing his license.  She states that once the neurologist told him this, he essentially stopped hearing him, and the appointment was ended abruptly.  Most recently a week ago Friday, he had multiple seizures in 1 night.  She has a video of these events.  The most recent one was Saturday morning early.  Her video documents a tonic-clonic seizure.  She also describes a postictal..  He  "has bitten his tongue multiple times and it is quite sore now.  He has been having difficulty getting an adequate p.o.'s because of this.  He is done odd things recently.  He has been forgetful.  States his son's bed broke 2 weeks ago and he just asked her about it recently despite it happening weeks ago.  He also drove to an old house that he has not lived in for years this week.  They went to their PCPs office today who instructed them to come here for evaluation.  He denies alcohol abuse.  He states he binge drinks but has never had alcohol withdrawal seizures.  Never experiences tremors.  He does use marijuana daily.  Denies other drug use.  Denies any other past medical history.  No history of childhood seizures.  No known previous head injuries.    PAST MEDICAL HISTORY   Seizure disorder    SURGICAL HISTORY   has a past surgical history that includes repair, tendon, lower extremity, using tendon graft (7/1/2011); nerve repair (7/1/2011); and tenotomy (7/1/2011).    FAMILY HISTORY  History reviewed. No pertinent family history.    SOCIAL HISTORY  Social History     Tobacco Use    Smoking status: Former    Smokeless tobacco: Never   Vaping Use    Vaping Use: Never used   Substance and Sexual Activity    Alcohol use: Yes     Comment: twice a month    Drug use: No    Sexual activity: Not on file       CURRENT MEDICATIONS  Home Medications       Reviewed by Yahir Yousif R.N. (Registered Nurse) on 09/22/23 at 1222  Med List Status: Not Addressed     Medication Last Dose Status   amoxicillin (AMOXIL) 875 MG tablet  Active   fluticasone (FLONASE) 50 MCG/ACT nasal spray  Active   ondansetron (ZOFRAN ODT) 4 MG TABLET DISPERSIBLE  Active                    ALLERGIES  No Known Allergies    PHYSICAL EXAM  VITAL SIGNS: BP (!) 167/85   Pulse 66   Temp 36.7 °C (98 °F) (Temporal)   Resp 16   Ht 1.727 m (5' 8\")   Wt 84.4 kg (186 lb)   SpO2 95%   BMI 28.28 kg/m²    Vitals reviewed.  Constitutional: Patient is oriented to " person, place, and time. Appears well-developed and well-nourished. No distress.    Head: Normocephalic and atraumatic.   Ears: Normal external ears bilaterally.   Mouth/Throat: Oropharynx is clear. Tongue has multiple healing wounds to the lateral aspect.   Eyes: Conjunctivae are normal. Pupils are equal, round, and reactive to light.   Neck: Normal range of motion. Neck supple.   Cardiovascular: Normal rate, regular rhythm and normal heart sounds.   Pulmonary/Chest: Effort normal and breath sounds normal. No respiratory distress, no wheezes, rhonchi, or rales.   Abdominal: Soft. Bowel sounds are normal. There is no tenderness  Musculoskeletal: No edema and no tenderness.   Neurological: No cranial nerve deficits. Normal motor and sensory exam. No focal deficits.   Skin: Skin is warm and dry. No erythema. No pallor.   Psychiatric: Patient has a normal mood and affect.     DIAGNOSTIC STUDIES / PROCEDURES  EKG  I have independently interpreted this EKG    LABS  Results for orders placed or performed during the hospital encounter of 09/22/23   CBC WITH DIFFERENTIAL   Result Value Ref Range    WBC 10.6 4.8 - 10.8 K/uL    RBC 5.51 4.70 - 6.10 M/uL    Hemoglobin 17.4 14.0 - 18.0 g/dL    Hematocrit 49.7 42.0 - 52.0 %    MCV 90.2 81.4 - 97.8 fL    MCH 31.6 27.0 - 33.0 pg    MCHC 35.0 32.3 - 36.5 g/dL    RDW 42.9 35.9 - 50.0 fL    Platelet Count 271 164 - 446 K/uL    MPV 9.1 9.0 - 12.9 fL    Neutrophils-Polys 65.40 44.00 - 72.00 %    Lymphocytes 23.70 22.00 - 41.00 %    Monocytes 9.10 0.00 - 13.40 %    Eosinophils 1.10 0.00 - 6.90 %    Basophils 0.40 0.00 - 1.80 %    Immature Granulocytes 0.30 0.00 - 0.90 %    Nucleated RBC 0.00 0.00 - 0.20 /100 WBC    Neutrophils (Absolute) 6.96 1.82 - 7.42 K/uL    Lymphs (Absolute) 2.52 1.00 - 4.80 K/uL    Monos (Absolute) 0.97 (H) 0.00 - 0.85 K/uL    Eos (Absolute) 0.12 0.00 - 0.51 K/uL    Baso (Absolute) 0.04 0.00 - 0.12 K/uL    Immature Granulocytes (abs) 0.03 0.00 - 0.11 K/uL    NRBC  (Absolute) 0.00 K/uL   CMP   Result Value Ref Range    Sodium 142 135 - 145 mmol/L    Potassium 3.3 (L) 3.6 - 5.5 mmol/L    Chloride 101 96 - 112 mmol/L    Co2 25 20 - 33 mmol/L    Anion Gap 16.0 7.0 - 16.0    Glucose 80 65 - 99 mg/dL    Bun 13 8 - 22 mg/dL    Creatinine 0.79 0.50 - 1.40 mg/dL    Calcium 9.5 8.5 - 10.5 mg/dL    Correct Calcium 8.9 8.5 - 10.5 mg/dL    AST(SGOT) 27 12 - 45 U/L    ALT(SGPT) 15 2 - 50 U/L    Alkaline Phosphatase 73 30 - 99 U/L    Total Bilirubin 1.2 0.1 - 1.5 mg/dL    Albumin 4.7 3.2 - 4.9 g/dL    Total Protein 7.4 6.0 - 8.2 g/dL    Globulin 2.7 1.9 - 3.5 g/dL    A-G Ratio 1.7 g/dL   URINE DRUG SCREEN   Result Value Ref Range    Amphetamines Urine Negative Negative    Barbiturates Negative Negative    Benzodiazepines Negative Negative    Cocaine Metabolite Negative Negative    Fentanyl, Urine Negative Negative    Methadone Negative Negative    Opiates Negative Negative    Oxycodone Negative Negative    Phencyclidine -Pcp Negative Negative    Propoxyphene Negative Negative    Cannabinoid Metab Positive (A) Negative   ESTIMATED GFR   Result Value Ref Range    GFR (CKD-EPI) 114 >60 mL/min/1.73 m 2     RADIOLOGY    COURSE & MEDICAL DECISION MAKING    ED Observation Status? Yes; I am placing the patient in to an observation status due to a diagnostic uncertainty as well as therapeutic intensity. Patient placed in observation status at 7:51 PM, 9/22/2023.     Observation plan is as follows: Given the diagnostic uncertainty, observation for further seizure activity, pending labs and neurology consultation, patient is placed in ED observation    Upon Reevaluation, the patient's condition has: Improved; and will be discharged.    Patient discharged from ED Observation status at 1720PM (Time) September 22, 2023 (Date).     INITIAL ASSESSMENT, COURSE AND PLAN  Care Narrative:   1:22 PM this is an overall well-appearing 42-year-old male who appears to have seizure disorder.  This based on family  description as well as a video that I was able to view that demonstrates a tonic-clonic seizure in a postictal period.  He is hypertensive but otherwise has reassuring vital signs.  He has healing wounds on his tongue that have been p.o. intake.  Will obtain labs.  He has followed up and establish care with neurology although medications and an MRI have not been undertaken as yet.  Patient has an appointment on October 13 for this.  We will plan to load with Keppra, contact neurology plan for outpatient follow-up.    Discussed with Dr. Perla, neuro hospitalist regarding patient's course over the last year.  Recent multiple seizures 5 days ago.  Negative CT scan in May of this year and an upcoming scheduled MRI.  He does not feel patient requires hospital admission.  Note sent to  to assist with rescheduling MRI possibly sooner.  He encourages the patient to start on Keppra 750 twice daily as directed.    2:28 PM patient evaluated at the bedside.  He is agreeable to loading with Keppra.  Labs reviewed.  White blood cell count is normal.  H&H normal.  No neutrophilic shift.  Potassium low 3.3.  This was previously demonstrated at the same value in May of this year.  The remainder of her CMP is entirely normal.  Drug screen is positive for cannabinoids which the patient self reported.    Patient is reevaluated prior to discharge.  Keppra is infused.  Patient seems much more amenable to complying with recommended treatments.  He assures me as does his partner at the bedside, that he will continue taking his Keppra 750 twice daily.  We will look for a call from the  for possibly rescheduling the MRI and he will follow-up with neurology as an outpatient.  Additionally, given his multiple chronic injuries, mild swelling making it difficult for him to meet.  I think it is reasonable to prescribe a course of amoxicillin as there is concern for infection. They are both given strict return precautions.  He is  discharged home in stable condition.    HYDRATION: Based on the patient's presentation of Dehydration and Other poor po intake the patient was given IV fluids. IV Hydration was used because oral hydration was not adequate alone. Upon recheck following hydration, the patient was Improved.        DISPOSITION AND DISCUSSIONS  I have discussed management of the patient with the following physicians and RODOLFO's:  None    Discussion of management with other [facility] or appropriate source(s): None     Escalation of care considered, and ultimately not performed:diagnostic imaging and acute inpatient care management, however at this time, the patient is most appropriate for outpatient management    Barriers to care at this time, including but not limited to:  None .     FINAL DIAGNOSIS  1. Seizure (HCC)    2. Tongue injury, initial encounter    3. Tongue infection           Electronically signed by: Esthela Tirado D.O., 9/22/2023 12:52 PM

## 2023-09-22 NOTE — ED NOTES
Chief Complaint   Patient presents with    Seizure     Arrives with complaints of seizures   States 1 month ago he had seizures while sleeping, witnessed by girlfriend   Then 2 days ago had another seizure while asleep, + tongue trauma   Denies hx of     Pt's S/O at bedside reports that this problem has been going on for years. Reports that he did not follow up after seeing neuro.  Pt's tongue is swollen, patchy and tender.  Pt reports that he is also having multiple problems with his short term memory and was driving home the other day and went to a residence that he had not lived in for several years.  AAOx4 at this time. Pt placed on monitor. PIV established, blood drawn and sent to lab.

## 2023-09-23 NOTE — ED NOTES
All discharge instructions given to pt.   Pt verbalized understanding of all discharge instructions. All lines removed prior to discharge. All questions answered. All personal belongings sent with pt. Pt ambulatory to [facility].

## 2023-10-13 ENCOUNTER — HOSPITAL ENCOUNTER (OUTPATIENT)
Dept: RADIOLOGY | Facility: MEDICAL CENTER | Age: 43
End: 2023-10-13
Attending: SPECIALIST
Payer: COMMERCIAL

## 2023-10-13 VITALS — WEIGHT: 170 LBS | BODY MASS INDEX: 25.18 KG/M2 | HEIGHT: 69 IN

## 2023-10-13 DIAGNOSIS — G40.901 STATUS EPILEPTICUS (HCC): ICD-10-CM

## 2023-10-13 PROCEDURE — 70553 MRI BRAIN STEM W/O & W/DYE: CPT

## 2023-10-13 PROCEDURE — A9270 NON-COVERED ITEM OR SERVICE: HCPCS | Performed by: RADIOLOGY

## 2023-10-13 PROCEDURE — 700117 HCHG RX CONTRAST REV CODE 255: Performed by: SPECIALIST

## 2023-10-13 PROCEDURE — 700102 HCHG RX REV CODE 250 W/ 637 OVERRIDE(OP): Performed by: RADIOLOGY

## 2023-10-13 PROCEDURE — A9579 GAD-BASE MR CONTRAST NOS,1ML: HCPCS | Performed by: SPECIALIST

## 2023-10-13 RX ORDER — ALPRAZOLAM 0.25 MG/1
0.5 TABLET ORAL
Status: COMPLETED | OUTPATIENT
Start: 2023-10-13 | End: 2023-10-13

## 2023-10-13 RX ADMIN — ALPRAZOLAM 0.5 MG: 0.25 TABLET ORAL at 12:31

## 2023-10-13 RX ADMIN — GADOTERIDOL 15 ML: 279.3 INJECTION, SOLUTION INTRAVENOUS at 13:30

## 2023-10-13 ASSESSMENT — FIBROSIS 4 INDEX: FIB4 SCORE: 1.08

## 2023-10-13 NOTE — DISCHARGE INSTRUCTIONS
MRI ADULT DISCHARGE INSTRUCTIONS    You have been medicated today for your scan. Please follow the instructions below to ensure your safe recovery. If you have any questions or problems, feel free to call us at [phone] or [phone].     1.   Have someone stay with you to assist you as needed.    2.   Do not drive or operate any mechanical devices.    3.   Do not perform any activity that requires concentration. Make no major decisions over the next 24 hours.     4.   Be careful changing positions from laying down to sitting or standing, as you may become dizzy.     5.   Do not drink alcohol for 48 hours.    6.   There are no restrictions for eating your normal meals. Drink fluids.    7.   You may continue your usual medications for pain, tranquilizers, muscle relaxants or sedatives when awake.     8.   Tomorrow, you may continue your normal daily activities.     9.   Pressure dressing on 10 - 15 minutes. If swelling or bleeding occurs when removed, continue placing direct pressure on injection site for another 5 minutes, or until bleeding stops.   Alprazolam (XANAX) tablet  What is this medicine?  You were prescribed ALPRAZOLAM (al PRAY ashley cast) for the procedure you had today. This medication is a benzodiazepine. It is used to treat anxiety and panic attacks.  This medicine may be used for other purposes; ask your health care provider or pharmacist if you have questions.  What side effects may I notice from receiving this medicine?  Side effects that you should report to your doctor or health care professional as soon as possible:  allergic reactions like skin rash, itching or hives, swelling of the face, lips, or tongue  confusion, forgetfulness  depression  difficulty sleeping  difficulty speaking  feeling faint or lightheaded, falls  mood changes, excitability or aggressive behavior  muscle cramps  trouble passing urine or change in the amount of urine  unusually weak or tired  Side effects that usually do not  require medical attention (report to your doctor or health care professional if they continue or are bothersome):  changes in appetite  change in sex drive or performance  This list may not describe all possible side effects. Call your doctor for medical advice about side effects. You may report side effects to FDA at 5-433-RUT-8761.     I have been informed of and understand the above discharge instructions.

## 2024-01-22 ENCOUNTER — TELEPHONE (OUTPATIENT)
Dept: HEALTH INFORMATION MANAGEMENT | Facility: OTHER | Age: 44
End: 2024-01-22
Payer: COMMERCIAL